# Patient Record
Sex: FEMALE | Race: BLACK OR AFRICAN AMERICAN | NOT HISPANIC OR LATINO | URBAN - METROPOLITAN AREA
[De-identification: names, ages, dates, MRNs, and addresses within clinical notes are randomized per-mention and may not be internally consistent; named-entity substitution may affect disease eponyms.]

---

## 2020-06-05 ENCOUNTER — TELEPHONE ENCOUNTER (OUTPATIENT)
Dept: URBAN - METROPOLITAN AREA CLINIC 23 | Facility: CLINIC | Age: 45
End: 2020-06-05

## 2020-06-08 ENCOUNTER — TELEPHONE ENCOUNTER (OUTPATIENT)
Dept: URBAN - METROPOLITAN AREA CLINIC 92 | Facility: CLINIC | Age: 45
End: 2020-06-08

## 2020-06-08 RX ORDER — ADALIMUMAB 40MG/0.4ML
INJECT 1 PEN KIT SUBCUTANEOUS
Refills: 10
Start: 2020-06-08

## 2020-06-19 ENCOUNTER — TELEPHONE ENCOUNTER (OUTPATIENT)
Dept: URBAN - METROPOLITAN AREA CLINIC 23 | Facility: CLINIC | Age: 45
End: 2020-06-19

## 2020-06-26 ENCOUNTER — TELEPHONE ENCOUNTER (OUTPATIENT)
Dept: URBAN - METROPOLITAN AREA CLINIC 23 | Facility: CLINIC | Age: 45
End: 2020-06-26

## 2020-06-30 ENCOUNTER — TELEPHONE ENCOUNTER (OUTPATIENT)
Dept: URBAN - METROPOLITAN AREA CLINIC 23 | Facility: CLINIC | Age: 45
End: 2020-06-30

## 2020-07-14 ENCOUNTER — OFFICE VISIT (OUTPATIENT)
Dept: URBAN - METROPOLITAN AREA CLINIC 96 | Facility: CLINIC | Age: 45
End: 2020-07-14

## 2020-07-27 ENCOUNTER — OFFICE VISIT (OUTPATIENT)
Dept: URBAN - METROPOLITAN AREA TELEHEALTH 2 | Facility: TELEHEALTH | Age: 45
End: 2020-07-27
Payer: COMMERCIAL

## 2020-07-27 DIAGNOSIS — D50.8 OTHER IRON DEFICIENCY ANEMIAS: ICD-10-CM

## 2020-07-27 DIAGNOSIS — Z79.899 LONG-TERM USE OF IMMUNOSUPPRESSANT MEDICATION: ICD-10-CM

## 2020-07-27 DIAGNOSIS — K50.80 CROHN'S DISEASE OF BOTH SMALL AND LARGE INTESTINE WITHOUT COMPLICATION: ICD-10-CM

## 2020-07-27 DIAGNOSIS — R19.8 BOWEL MOVEMENT SYMPTOM: ICD-10-CM

## 2020-07-27 DIAGNOSIS — Z09 FOLLOW UP: ICD-10-CM

## 2020-07-27 PROCEDURE — G9903 PT SCRN TBCO ID AS NON USER: HCPCS | Performed by: INTERNAL MEDICINE

## 2020-07-27 PROCEDURE — G8417 CALC BMI ABV UP PARAM F/U: HCPCS | Performed by: INTERNAL MEDICINE

## 2020-07-27 PROCEDURE — G8427 DOCREV CUR MEDS BY ELIG CLIN: HCPCS | Performed by: INTERNAL MEDICINE

## 2020-07-27 PROCEDURE — 1036F TOBACCO NON-USER: CPT | Performed by: INTERNAL MEDICINE

## 2020-07-27 PROCEDURE — G8482 FLU IMMUNIZE ORDER/ADMIN: HCPCS | Performed by: INTERNAL MEDICINE

## 2020-07-27 PROCEDURE — 99214 OFFICE O/P EST MOD 30 MIN: CPT | Performed by: INTERNAL MEDICINE

## 2020-07-27 PROCEDURE — 3017F COLORECTAL CA SCREEN DOC REV: CPT | Performed by: INTERNAL MEDICINE

## 2020-07-27 PROCEDURE — G9622 NO UNHEAL ETOH USER: HCPCS | Performed by: INTERNAL MEDICINE

## 2020-07-27 RX ORDER — ADALIMUMAB 40MG/0.4ML
INJECT 1 PEN KIT SUBCUTANEOUS
Refills: 10 | Status: ACTIVE | COMMUNITY
Start: 2020-06-08

## 2020-07-27 RX ORDER — LOSARTAN POTASSIUM 50 MG/1
TABLET ORAL
Qty: 0 | Refills: 0 | Status: ACTIVE | COMMUNITY
Start: 1900-01-01 | End: 1900-01-01

## 2020-07-27 RX ORDER — ADALIMUMAB 40MG/0.8ML
INJECT 0.8 MILLILITER (40 MG) BY SUBCUTANEOUS ROUTE ONCE WEEKLY IN THE ABDOMEN OR THIGH (ROTATE SITES) KIT SUBCUTANEOUS
Qty: 2 | Refills: 6 | Status: ACTIVE | COMMUNITY
Start: 2019-11-04 | End: 1900-01-01

## 2020-07-27 RX ORDER — PREDNISONE 5 MG/1
TAKE 1.5 PILLS DAILY TABLET ORAL
Qty: 60 | Refills: 1 | Status: ACTIVE | COMMUNITY
Start: 2020-03-26 | End: 1900-01-01

## 2020-07-27 RX ORDER — ADALIMUMAB 40MG/0.4ML
0.4 ML KIT SUBCUTANEOUS
Qty: 1 | Refills: 11 | OUTPATIENT
Start: 2020-07-27 | End: 2021-06-28

## 2020-07-27 NOTE — HPI-TODAY'S VISIT:
Julian Shi is a 43 y/o indiv with ileal and colonic CD, currently on Humira (every 2 weeks), here for eval of refractory sxs. . Pt referred by Dr. Servin. . Pt was diagnosed with CD around 2014 (not on any meds), though had sxs prior to this.  She start on Humira in 2016 due to HS, by her dermatologist.  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 She was then re-referred to GI and at the time, her CD was also getting worse.  She was hospitalized 4 times in 2019 due to obstruction sxs.  The dose of Humira was then increase to weekly by Dr Servin due to progression of sxs.  In May 2020 she had partial resection due to progression of her sxs of obstruction (Dr. Jones). . Today on 7/27/2020, pt reports that she is having no abdominal pain while on a GI Soft diet.  She is not having any abdominal pain.  She is having regular BM.  About 24 hours after her Humira injection, she has a large BM, but otherwise, normal. Small amount of blood if she strains at time of defecation.  Her HS is stable on Humira, but not as inflammed as prior.  Pyoderma is on her leg, but is stable.  . Labs 4/2020: Hgb 13, wbc 6.8, Iron st 13, Humira 5.6 (AB neg), quant-gold neg, HepB neg, Vit D 24.1, ferritin 38, CRP 19 . OSH Path: 4/2014: cecum showing active and chronic colitis.  Duodenum showing marked focally active inflammation with ulcer . 5/2019: - The digital rectal exam was normal. - The retroflexed view of the distal rectum and anal verge was normal and showed no anal or rectal abnormalities. - An area of mildly congested, erythematous and eroded mucosa was found in the distal rectum.  Biopsies were taken with a cold forceps for histology. - A benign-appearing, intrinsic moderate stenosis measuring 4 mm (inner diameter) was found at the ileocecal valve and was non-traversed. - A 12 mm polypoid lesion was found at the ileocecal valve.  The lesion was sessile.  Also a smaller 9 mm polypoid lesion. No bleeding was present.  Biopsies were taken with a cold forceps for histology. - The exam was otherwise normal throughout the examined colon. . FinalPathologicDiagnosis A.ILEOCECALVALVE,BIOPSY: -ENTERICMUCOSAHASMILDCHRONICACTIVEINFLAMMATIONWITH FOCALEROSIVECHANGE. -NOGRANULOMASAREIDENTIFIED.     B.RECTUM,BIOPSY: -CHRONICACTIVECOLITIS. -NOGRANULOMASAREIDENTIFIED.     C.DUODENUM,BIOPSY: -ACTIVEDUODENITISWITHREGENERATIVECHANGE?EROSION. -NOGRANULOMASAREIDENTIFIED.     COMMENT:Thoughthehistologicfindingsarenotspecific,theyarecompatiblewiththeclinical/endoscopic considerationofinflammatoryboweldisease.Nodysplasiaisidentified.

## 2020-10-13 ENCOUNTER — TELEPHONE ENCOUNTER (OUTPATIENT)
Dept: URBAN - METROPOLITAN AREA CLINIC 92 | Facility: CLINIC | Age: 45
End: 2020-10-13

## 2021-04-20 ENCOUNTER — WEB ENCOUNTER (OUTPATIENT)
Dept: URBAN - METROPOLITAN AREA CLINIC 96 | Facility: CLINIC | Age: 46
End: 2021-04-20

## 2021-04-20 ENCOUNTER — OFFICE VISIT (OUTPATIENT)
Dept: URBAN - METROPOLITAN AREA CLINIC 96 | Facility: CLINIC | Age: 46
End: 2021-04-20
Payer: COMMERCIAL

## 2021-04-20 ENCOUNTER — LAB OUTSIDE AN ENCOUNTER (OUTPATIENT)
Dept: URBAN - METROPOLITAN AREA CLINIC 96 | Facility: CLINIC | Age: 46
End: 2021-04-20

## 2021-04-20 DIAGNOSIS — Z09 FOLLOW UP: ICD-10-CM

## 2021-04-20 DIAGNOSIS — Z79.899 LONG-TERM USE OF IMMUNOSUPPRESSANT MEDICATION: ICD-10-CM

## 2021-04-20 DIAGNOSIS — K50.80 CROHN'S DISEASE OF BOTH SMALL AND LARGE INTESTINE WITHOUT COMPLICATION: ICD-10-CM

## 2021-04-20 DIAGNOSIS — Z71.89 VACCINE COUNSELING: ICD-10-CM

## 2021-04-20 DIAGNOSIS — D64.9 ANEMIA: ICD-10-CM

## 2021-04-20 DIAGNOSIS — R19.8 BOWEL MOVEMENT SYMPTOM: ICD-10-CM

## 2021-04-20 DIAGNOSIS — Z91.89 COLON CANCER HIGH RISK: ICD-10-CM

## 2021-04-20 PROCEDURE — 99214 OFFICE O/P EST MOD 30 MIN: CPT | Performed by: INTERNAL MEDICINE

## 2021-04-20 RX ORDER — ADALIMUMAB 40MG/0.8ML
INJECT 0.8 MILLILITER (40 MG) BY SUBCUTANEOUS ROUTE ONCE WEEKLY IN THE ABDOMEN OR THIGH (ROTATE SITES) KIT SUBCUTANEOUS
Qty: 2 | Refills: 6 | Status: ACTIVE | COMMUNITY
Start: 2019-11-04

## 2021-04-20 RX ORDER — LOSARTAN POTASSIUM 50 MG/1
TABLET ORAL
Qty: 0 | Refills: 0 | Status: ACTIVE | COMMUNITY
Start: 1900-01-01

## 2021-04-20 RX ORDER — PREDNISONE 5 MG/1
TAKE 1.5 PILLS DAILY TABLET ORAL
Qty: 60 | Refills: 1 | Status: ACTIVE | COMMUNITY
Start: 2020-03-26

## 2021-04-20 RX ORDER — ADALIMUMAB 40MG/0.4ML
0.4 ML KIT SUBCUTANEOUS
Qty: 1 | Refills: 11 | Status: ACTIVE | COMMUNITY
Start: 2020-07-27 | End: 2021-06-28

## 2021-04-20 RX ORDER — SODIUM SULFATE, MAGNESIUM SULFATE, AND POTASSIUM CHLORIDE 17.75; 2.7; 2.25 G/1; G/1; G/1
12 TABLETS TABLET ORAL
Qty: 24 TABLET | Refills: 0 | OUTPATIENT
Start: 2021-04-20 | End: 2021-04-21

## 2021-04-20 RX ORDER — ADALIMUMAB 40MG/0.4ML
0.4 ML KIT SUBCUTANEOUS
Qty: 1 | Refills: 11 | OUTPATIENT

## 2021-04-20 RX ORDER — ADALIMUMAB 40MG/0.4ML
INJECT 1 PEN KIT SUBCUTANEOUS
Refills: 10 | Status: ACTIVE | COMMUNITY
Start: 2020-06-08

## 2021-04-20 NOTE — HPI-TODAY'S VISIT:
Julian Shi is a 46 y/o indiv with ileal and colonic CD, currently on Humira (every 2 weeks), here for eval of refractory sxs. . Pt referred by Dr. Servin. . Pt was diagnosed with CD around 2014 (not on any meds), though had sxs prior to this.  She start on Humira in 2016 due to HS, by her dermatologist.  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 She was then re-referred to GI and at the time, her CD was also getting worse.  She was hospitalized 4 times in 2019 due to obstruction sxs.  The dose of Humira was then increase to weekly by Dr Servin due to progression of sxs.  In May 2020 she had partial resection due to progression of her sxs of obstruction (Dr. Jones). . Previously on 7/27/2020, pt reports that she is having no abdominal pain while on a GI Soft diet.  She is not having any abdominal pain.  She is having regular BM.  About 24 hours after her Humira injection, she has a large BM, but otherwise, normal. Small amount of blood if she strains at time of defecation.  Her HS is stable on Humira, but not as inflammed as prior.  Pyoderma is on her leg, but is stable.  . Today on 4/20/2021, pt reports that her GI is doing well, no diarrhea, no abdominal pain, no nausea.  HS is doing well.  . Labs 4/2020: Hgb 13, wbc 6.8, Iron st 13, Humira 5.6 (AB neg), quant-gold neg, HepB neg, Vit D 24.1, ferritin 38, CRP 19 . OSH Path: 4/2014: cecum showing active and chronic colitis.  Duodenum showing marked focally active inflammation with ulcer . 5/2019: - The digital rectal exam was normal. - The retroflexed view of the distal rectum and anal verge was normal and showed no anal or rectal abnormalities. - An area of mildly congested, erythematous and eroded mucosa was found in the distal rectum.  Biopsies were taken with a cold forceps for histology. - A benign-appearing, intrinsic moderate stenosis measuring 4 mm (inner diameter) was found at the ileocecal valve and was non-traversed. - A 12 mm polypoid lesion was found at the ileocecal valve.  The lesion was sessile.  Also a smaller 9 mm polypoid lesion. No bleeding was present.  Biopsies were taken with a cold forceps for histology. - The exam was otherwise normal throughout the examined colon. . FinalPathologicDiagnosis A.ILEOCECALVALVE,BIOPSY: -ENTERICMUCOSAHASMILDCHRONICACTIVEINFLAMMATIONWITH FOCALEROSIVECHANGE. -NOGRANULOMASAREIDENTIFIED.     B.RECTUM,BIOPSY: -CHRONICACTIVECOLITIS. -NOGRANULOMASAREIDENTIFIED.     C.DUODENUM,BIOPSY: -ACTIVEDUODENITISWITHREGENERATIVECHANGE?EROSION. -NOGRANULOMASAREIDENTIFIED.     COMMENT:Thoughthehistologicfindingsarenotspecific,theyarecompatiblewiththeclinical/endoscopic considerationofinflammatoryboweldisease.Nodysplasiaisidentified.

## 2021-05-08 LAB
A/G RATIO: 1.1
ADALIMUMAB DRUG LEVEL: 1.6
ALBUMIN: 4
ALKALINE PHOSPHATASE: 112
ALT (SGPT): 14
ANTI-ADALIMUMAB ANTIBODY: <25
AST (SGOT): 23
BASO (ABSOLUTE): 0
BASOS: 1
BILIRUBIN, TOTAL: 0.6
BUN/CREATININE RATIO: 9
BUN: 7
CALCIUM: 9.2
CARBON DIOXIDE, TOTAL: 24
CHLORIDE: 101
CREATININE: 0.78
EGFR IF AFRICN AM: 105
EGFR IF NONAFRICN AM: 91
EOS (ABSOLUTE): 0.1
EOS: 2
GLOBULIN, TOTAL: 3.5
GLUCOSE: 112
HEMATOCRIT: 38.8
HEMATOLOGY COMMENTS:: (no result)
HEMOGLOBIN: 12.4
IMMATURE CELLS: (no result)
IMMATURE GRANS (ABS): 0
IMMATURE GRANULOCYTES: 0
LYMPHS (ABSOLUTE): 2
LYMPHS: 36
MCH: 26.1
MCHC: 32
MCV: 82
MONOCYTES(ABSOLUTE): 0.4
MONOCYTES: 7
NEUTROPHILS (ABSOLUTE): 3.1
NEUTROPHILS: 54
NRBC: (no result)
PLATELETS: 355
POTASSIUM: 4.2
PROTEIN, TOTAL: 7.5
QUANTIFERON CRITERIA: (no result)
QUANTIFERON INCUBATION: (no result)
QUANTIFERON MITOGEN VALUE: >10
QUANTIFERON NIL VALUE: 0.15
QUANTIFERON TB1 AG VALUE: 0.17
QUANTIFERON TB2 AG VALUE: 0.16
QUANTIFERON-TB GOLD PLUS: NEGATIVE
RBC: 4.75
RDW: 16.1
SODIUM: 137
VITAMIN B12: 1014
VITAMIN D, 25-HYDROXY: 23.8
WBC: 5.6

## 2021-07-08 PROBLEM — 71833008 CROHN'S DISEASE OF SMALL AND LARGE INTESTINES: Status: ACTIVE | Noted: 2021-07-08

## 2021-08-06 ENCOUNTER — OFFICE VISIT (OUTPATIENT)
Dept: URBAN - METROPOLITAN AREA MEDICAL CENTER 28 | Facility: MEDICAL CENTER | Age: 46
End: 2021-08-06
Payer: COMMERCIAL

## 2021-08-06 DIAGNOSIS — Z98.0 H/O BILLROTH II OPERATION: ICD-10-CM

## 2021-08-06 DIAGNOSIS — K50.80 CROHN'S COLITIS: ICD-10-CM

## 2021-08-06 PROCEDURE — 45380 COLONOSCOPY AND BIOPSY: CPT | Performed by: INTERNAL MEDICINE

## 2021-08-06 RX ORDER — ADALIMUMAB 40MG/0.4ML
INJECT 1 PEN KIT SUBCUTANEOUS
Refills: 10 | Status: ACTIVE | COMMUNITY
Start: 2020-06-08

## 2021-08-06 RX ORDER — LOSARTAN POTASSIUM 50 MG/1
TABLET ORAL
Qty: 0 | Refills: 0 | Status: ACTIVE | COMMUNITY
Start: 1900-01-01

## 2021-08-06 RX ORDER — ADALIMUMAB 40MG/0.8ML
INJECT 0.8 MILLILITER (40 MG) BY SUBCUTANEOUS ROUTE ONCE WEEKLY IN THE ABDOMEN OR THIGH (ROTATE SITES) KIT SUBCUTANEOUS
Qty: 2 | Refills: 6 | Status: ACTIVE | COMMUNITY
Start: 2019-11-04

## 2021-08-06 RX ORDER — PREDNISONE 5 MG/1
TAKE 1.5 PILLS DAILY TABLET ORAL
Qty: 60 | Refills: 1 | Status: ACTIVE | COMMUNITY
Start: 2020-03-26

## 2021-08-06 RX ORDER — ADALIMUMAB 40MG/0.4ML
0.4 ML KIT SUBCUTANEOUS
Qty: 1 | Refills: 11 | Status: ACTIVE | COMMUNITY

## 2021-09-13 ENCOUNTER — OFFICE VISIT (OUTPATIENT)
Dept: URBAN - METROPOLITAN AREA TELEHEALTH 2 | Facility: TELEHEALTH | Age: 46
End: 2021-09-13
Payer: COMMERCIAL

## 2021-09-13 ENCOUNTER — TELEPHONE ENCOUNTER (OUTPATIENT)
Dept: URBAN - METROPOLITAN AREA CLINIC 92 | Facility: CLINIC | Age: 46
End: 2021-09-13

## 2021-09-13 DIAGNOSIS — K76.0 FATTY (CHANGE OF) LIVER: ICD-10-CM

## 2021-09-13 DIAGNOSIS — K50.80 CROHN'S DISEASE OF BOTH SMALL AND LARGE INTESTINE WITHOUT COMPLICATION: ICD-10-CM

## 2021-09-13 DIAGNOSIS — Z79.899 LONG-TERM USE OF IMMUNOSUPPRESSANT MEDICATION: ICD-10-CM

## 2021-09-13 DIAGNOSIS — D64.89 ANEMIA DUE TO OTHER CAUSE: ICD-10-CM

## 2021-09-13 PROBLEM — 71833008 CROHN'S DISEASE OF SMALL AND LARGE INTESTINES: Status: ACTIVE | Noted: 2021-04-20

## 2021-09-13 PROCEDURE — 99215 OFFICE O/P EST HI 40 MIN: CPT | Performed by: INTERNAL MEDICINE

## 2021-09-13 RX ORDER — METHOTREXATE SODIUM 2.5 MG/1
5 TABLET ORAL WEEKLY
Qty: 60 | Refills: 1 | OUTPATIENT
Start: 2021-09-13

## 2021-09-13 RX ORDER — FOLIC ACID 1 MG/1
1 TABLET TABLET ORAL ONCE A DAY
Qty: 30 | Refills: 11 | OUTPATIENT
Start: 2021-09-13 | End: 2022-09-08

## 2021-09-13 RX ORDER — ADALIMUMAB 40MG/0.4ML
INJECT 1 PEN KIT SUBCUTANEOUS
Refills: 10 | Status: ACTIVE | COMMUNITY
Start: 2020-06-08

## 2021-09-13 RX ORDER — SULFASALAZINE 500 MG/1
4 TABLET TABLET ORAL ONCE A DAY
Qty: 120 TABLET | Refills: 4 | OUTPATIENT
Start: 2021-09-13 | End: 2022-02-09

## 2021-09-13 RX ORDER — ADALIMUMAB 40MG/0.4ML
0.4 ML KIT SUBCUTANEOUS
Qty: 1 | Refills: 11 | Status: ACTIVE | COMMUNITY

## 2021-09-13 RX ORDER — ADALIMUMAB 40MG/0.4ML
0.4 ML KIT SUBCUTANEOUS
Qty: 1 | Refills: 11 | OUTPATIENT

## 2021-09-13 RX ORDER — LOSARTAN POTASSIUM 50 MG/1
TABLET ORAL
Qty: 0 | Refills: 0 | Status: ACTIVE | COMMUNITY
Start: 1900-01-01

## 2021-09-13 RX ORDER — PREDNISONE 5 MG/1
TAKE 1.5 PILLS DAILY TABLET ORAL
Qty: 60 | Refills: 1 | Status: ACTIVE | COMMUNITY
Start: 2020-03-26

## 2021-09-13 RX ORDER — ADALIMUMAB 40MG/0.8ML
INJECT 0.8 MILLILITER (40 MG) BY SUBCUTANEOUS ROUTE ONCE WEEKLY IN THE ABDOMEN OR THIGH (ROTATE SITES) KIT SUBCUTANEOUS
Qty: 2 | Refills: 6 | Status: ACTIVE | COMMUNITY
Start: 2019-11-04

## 2021-09-13 NOTE — HPI-TODAY'S VISIT:
Julian Shi is a 46 y/o indiv with ileal and colonic CD, currently on Humira (every 2 weeks), here for eval of refractory sxs. . Pt referred by Dr. Servin. . Pt was diagnosed with CD around 2014 (not on any meds), though had sxs prior to this.  She start on Humira in 2016 due to HS, by her dermatologist.  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 She was then re-referred to GI and at the time, her CD was also getting worse.  She was hospitalized 4 times in 2019 due to obstruction sxs.  The dose of Humira was then increase to weekly by Dr Servin due to progression of sxs.  In May 2020 she had partial resection due to progression of her sxs of obstruction (Dr. Jones). . Previously on 7/27/2020, pt reports that she is having no abdominal pain while on a GI Soft diet.  She is not having any abdominal pain.  She is having regular BM.  About 24 hours after her Humira injection, she has a large BM, but otherwise, normal. Small amount of blood if she strains at time of defecation.  Her HS is stable on Humira, but not as inflammed as prior.  Pyoderma is on her leg, but is stable.  . Previously on 4/20/2021, pt reports that her GI is doing well, no diarrhea, no abdominal pain, no nausea.  HS is doing well.  . Today on 9/13/2021, pt reports that she is otherwise doing fine. . Labs 4/2020: Hgb 13, wbc 6.8, Iron st 13, Humira 5.6 (AB neg), quant-gold neg, HepB neg, Vit D 24.1, ferritin 38, CRP 19 . 8/2021: some ulcerations seen at the anastomosis. Biopsies showed mild-architecteral changes but mild.  Rectum shows active colitis present. . OSH Path: 4/2014: cecum showing active and chronic colitis.  Duodenum showing marked focally active inflammation with ulcer . 5/2019: - The digital rectal exam was normal. - The retroflexed view of the distal rectum and anal verge was normal and showed no anal or rectal abnormalities. - An area of mildly congested, erythematous and eroded mucosa was found in the distal rectum.  Biopsies were taken with a cold forceps for histology. - A benign-appearing, intrinsic moderate stenosis measuring 4 mm (inner diameter) was found at the ileocecal valve and was non-traversed. - A 12 mm polypoid lesion was found at the ileocecal valve.  The lesion was sessile.  Also a smaller 9 mm polypoid lesion. No bleeding was present.  Biopsies were taken with a cold forceps for histology. - The exam was otherwise normal throughout the examined colon. . FinalPathologicDiagnosis A.ILEOCECALVALVE,BIOPSY: -ENTERICMUCOSAHASMILDCHRONICACTIVEINFLAMMATIONWITH FOCALEROSIVECHANGE. -NOGRANULOMASAREIDENTIFIED.     B.RECTUM,BIOPSY: -CHRONICACTIVECOLITIS. -NOGRANULOMASAREIDENTIFIED.     C.DUODENUM,BIOPSY: -ACTIVEDUODENITISWITHREGENERATIVECHANGE?EROSION. -NOGRANULOMASAREIDENTIFIED.     COMMENT:Thoughthehistologicfindingsarenotspecific,theyarecompatiblewiththeclinical/endoscopic considerationofinflammatoryboweldisease.Nodysplasiaisidentified.

## 2021-10-08 LAB
ALPHA 2-MACROGLOBULINS, QN: 238
ALT (SGPT) P5P: 13
APOLIPOPROTEIN A-1: 144
AST (SGOT) P5P: 25
BILIRUBIN, TOTAL: 0.7
CHOLESTEROL, TOTAL: 196
COMMENT:: (no result)
FIBROSIS SCORE: 0.32
FIBROSIS SCORING:: (no result)
FIBROSIS STAGE: (no result)
GGT: 72
GLUCOSE, SERUM: 86
HAPTOGLOBIN: 153
HEIGHT:: 65
INTERPRETATIONS:: (no result)
LIMITATIONS:: (no result)
NASH GRADE: (no result)
NASH SCORE: 0.5
NASH SCORING: (no result)
STEATOSIS GRADE: (no result)
STEATOSIS GRADING: (no result)
STEATOSIS SCORE: 0.53
TRIGLYCERIDES: 121
WEIGHT:: 321

## 2021-10-18 ENCOUNTER — TELEPHONE ENCOUNTER (OUTPATIENT)
Dept: URBAN - METROPOLITAN AREA CLINIC 98 | Facility: CLINIC | Age: 46
End: 2021-10-18

## 2021-10-18 RX ORDER — FOLIC ACID 1 MG/1
1 TABLET TABLET ORAL ONCE A DAY
Qty: 90 TABLET | Refills: 4
Start: 2021-09-13 | End: 2023-01-10

## 2022-03-30 ENCOUNTER — P2P PATIENT RECORD (OUTPATIENT)
Age: 47
End: 2022-03-30

## 2022-05-05 ENCOUNTER — TELEPHONE ENCOUNTER (OUTPATIENT)
Dept: URBAN - METROPOLITAN AREA CLINIC 92 | Facility: CLINIC | Age: 47
End: 2022-05-05

## 2022-05-05 RX ORDER — ADALIMUMAB 40MG/0.4ML
0.4 ML KIT SUBCUTANEOUS
Qty: 1 | Refills: 11
End: 2023-04-07

## 2023-04-12 ENCOUNTER — TELEPHONE ENCOUNTER (OUTPATIENT)
Dept: URBAN - METROPOLITAN AREA CLINIC 96 | Facility: CLINIC | Age: 48
End: 2023-04-12

## 2023-04-12 RX ORDER — ADALIMUMAB 40MG/0.4ML
0.4 ML KIT SUBCUTANEOUS
Qty: 4 | Refills: 11 | OUTPATIENT
Start: 2023-04-12 | End: 2024-03-13

## 2023-04-13 ENCOUNTER — TELEPHONE ENCOUNTER (OUTPATIENT)
Dept: URBAN - METROPOLITAN AREA CLINIC 96 | Facility: CLINIC | Age: 48
End: 2023-04-13

## 2023-04-13 RX ORDER — ADALIMUMAB 40MG/0.4ML
0.4 ML KIT SUBCUTANEOUS
Qty: 1 | Refills: 11
Start: 2023-04-12 | End: 2024-03-18

## 2023-04-26 LAB
A/G RATIO: 1.1
ABSOLUTE BASOPHILS: 49
ABSOLUTE EOSINOPHILS: 63
ABSOLUTE LYMPHOCYTES: 2436
ABSOLUTE MONOCYTES: 602
ABSOLUTE NEUTROPHILS: 3850
ALBUMIN: 3.8
ALKALINE PHOSPHATASE: 84
ALT (SGPT): 15
AST (SGOT): 17
BASOPHILS: 0.7
BILIRUBIN, TOTAL: 0.6
BUN/CREATININE RATIO: (no result)
BUN: 10
CALCIUM: 9.4
CARBON DIOXIDE, TOTAL: 28
CHLORIDE: 102
CREATININE: 0.7
EGFR: 107
EOSINOPHILS: 0.9
GLOBULIN, TOTAL: 3.6
GLUCOSE: 98
HEMATOCRIT: 33
HEMOGLOBIN: 10.6
LYMPHOCYTES: 34.8
MCH: 23.8
MCHC: 32.1
MCV: 74.2
MITOGEN-NIL: >10
MONOCYTES: 8.6
MPV: 10.4
NEUTROPHILS: 55
PLATELET COUNT: 364
POTASSIUM: 4.1
PROTEIN, TOTAL: 7.4
QUANTIFERON NIL VALUE: 0.02
QUANTIFERON TB1 AG VALUE: 0.01
QUANTIFERON TB2 AG VALUE: 0
QUANTIFERON-TB GOLD PLUS: NEGATIVE
RDW: 16.5
RED BLOOD CELL COUNT: 4.45
SODIUM: 137
WHITE BLOOD CELL COUNT: 7

## 2023-06-14 ENCOUNTER — LAB OUTSIDE AN ENCOUNTER (OUTPATIENT)
Dept: URBAN - METROPOLITAN AREA CLINIC 96 | Facility: CLINIC | Age: 48
End: 2023-06-14

## 2023-06-14 ENCOUNTER — OFFICE VISIT (OUTPATIENT)
Dept: URBAN - METROPOLITAN AREA CLINIC 96 | Facility: CLINIC | Age: 48
End: 2023-06-14
Payer: COMMERCIAL

## 2023-06-14 VITALS
TEMPERATURE: 97.7 F | HEIGHT: 65 IN | BODY MASS INDEX: 48.82 KG/M2 | DIASTOLIC BLOOD PRESSURE: 85 MMHG | WEIGHT: 293 LBS | SYSTOLIC BLOOD PRESSURE: 160 MMHG

## 2023-06-14 DIAGNOSIS — R19.8 BOWEL MOVEMENT SYMPTOM: ICD-10-CM

## 2023-06-14 DIAGNOSIS — K76.0 FATTY (CHANGE OF) LIVER: ICD-10-CM

## 2023-06-14 DIAGNOSIS — K50.80 CROHN'S DISEASE OF BOTH SMALL AND LARGE INTESTINE WITHOUT COMPLICATION: ICD-10-CM

## 2023-06-14 DIAGNOSIS — D50.8 ACQUIRED IRON DEFICIENCY ANEMIA DUE TO DECREASED ABSORPTION: ICD-10-CM

## 2023-06-14 PROCEDURE — 99215 OFFICE O/P EST HI 40 MIN: CPT | Performed by: INTERNAL MEDICINE

## 2023-06-14 RX ORDER — LOSARTAN POTASSIUM 50 MG/1
TABLET ORAL
Qty: 0 | Refills: 0 | Status: ACTIVE | COMMUNITY
Start: 1900-01-01

## 2023-06-14 RX ORDER — ADALIMUMAB 40MG/0.4ML
0.4 ML KIT SUBCUTANEOUS
Qty: 1 | Refills: 11 | Status: ACTIVE | COMMUNITY
Start: 2023-04-12 | End: 2024-03-18

## 2023-06-14 RX ORDER — ADALIMUMAB 40MG/0.4ML
0.4 ML KIT SUBCUTANEOUS
Qty: 1 | Refills: 11 | OUTPATIENT

## 2023-06-14 NOTE — HPI-TODAY'S VISIT:
Pt Jose Guadalupe is a 48 y/o indiv with ileal and colonic CD, currently on Humira (every 2 weeks), here for eval of refractory sxs. . Pt referred by Dr. Servin. . Pt was diagnosed with CD around 2014 (not on any meds), though had sxs prior to this.  She start on Humira in 2016 due to HS, by her dermatologist.  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 She was then re-referred to GI and at the time, her CD was also getting worse.  She was hospitalized 4 times in 2019 due to obstruction sxs.  The dose of Humira was then increase to weekly by Dr Servin due to progression of sxs.  In May 2020 she had partial resection due to progression of her sxs of obstruction (Dr. Jones).  Was not able to tolerate sulfasalazine due to size. . Previously on 7/27/2020, pt reports that she is having no abdominal pain while on a GI Soft diet.  She is not having any abdominal pain.  She is having regular BM.  About 24 hours after her Humira injection, she has a large BM, but otherwise, normal. Small amount of blood if she strains at time of defecation.  Her HS is stable on Humira, but not as inflammed as prior.  Pyoderma is on her leg, but is stable.  . Previously on 4/20/2021, pt reports that her GI is doing well, no diarrhea, no abdominal pain, no nausea.  HS is doing well.  . Previously on 9/13/2021, pt reports that she is otherwise doing fine. . Today on 6/14/2023, pt reports she had retinal detachment and also thyroid resection.  Some recurrent sinus issues ever since getting covid booster.  Has sleep apnea.  Weight has been going upwards.  GI is otherwise doing well.  Having tonsils removed for isssues for URI and sleep apnea.   . Labs 4/2020: Hgb 13, wbc 6.8, Iron st 13, Humira 5.6 (AB neg), quant-gold neg, HepB neg, Vit D 24.1, ferritin 38, CRP 19 . 8/2021: some ulcerations seen at the anastomosis. Biopsies showed mild-architecteral changes but mild.  Rectum shows active colitis present. . OSH Path: 4/2014: cecum showing active and chronic colitis.  Duodenum showing marked focally active inflammation with ulcer . 5/2019: - The digital rectal exam was normal. - The retroflexed view of the distal rectum and anal verge was normal and showed no anal or rectal abnormalities. - An area of mildly congested, erythematous and eroded mucosa was found in the distal rectum.  Biopsies were taken with a cold forceps for histology. - A benign-appearing, intrinsic moderate stenosis measuring 4 mm (inner diameter) was found at the ileocecal valve and was non-traversed. - A 12 mm polypoid lesion was found at the ileocecal valve.  The lesion was sessile.  Also a smaller 9 mm polypoid lesion. No bleeding was present.  Biopsies were taken with a cold forceps for histology. - The exam was otherwise normal throughout the examined colon. . FinalPathologicDiagnosis A.ILEOCECALVALVE,BIOPSY: -ENTERICMUCOSAHASMILDCHRONICACTIVEINFLAMMATIONWITH FOCALEROSIVECHANGE. -NOGRANULOMASAREIDENTIFIED.     B.RECTUM,BIOPSY: -CHRONICACTIVECOLITIS. -NOGRANULOMASAREIDENTIFIED.     C.DUODENUM,BIOPSY: -ACTIVEDUODENITISWITHREGENERATIVECHANGE?EROSION. -NOGRANULOMASAREIDENTIFIED.     COMMENT:Thoughthehistologicfindingsarenotspecific,theyarecompatiblewiththeclinical/endoscopic considerationofinflammatoryboweldisease.Nodysplasiaisidentified.

## 2023-06-26 LAB
A/G RATIO: 1.1
ABSOLUTE BASOPHILS: 43
ABSOLUTE EOSINOPHILS: 79
ABSOLUTE LYMPHOCYTES: 2174
ABSOLUTE MONOCYTES: 540
ABSOLUTE NEUTROPHILS: 4363
ADALIMUMAB AB, IBD: <10
ADALIMUMAB AB, IBD: <10
ADALIMUMAB LEVEL, IBD: 3.8
ALBUMIN: 4.2
ALKALINE PHOSPHATASE: 81
ALT (SGPT): 15
AST (SGOT): 20
BASOPHILS: 0.6
BILIRUBIN, TOTAL: 0.8
BUN/CREATININE RATIO: (no result)
BUN: 9
CALCIUM: 9.8
CARBON DIOXIDE, TOTAL: 26
CHLORIDE: 100
CHOL/HDLC RATIO: 3.6
CHOLESTEROL, TOTAL: 198
COMMENT: (no result)
CREATININE: 0.66
EGFR: 108
EOSINOPHILS: 1.1
FERRITIN, SERUM: 7
GLOBULIN, TOTAL: 3.8
GLUCOSE: 93
HDL CHOLESTEROL: 55
HEMATOCRIT: 34.8
HEMOGLOBIN: 11.3
INTERPRETATION: (no result)
IRON BIND.CAP.(TIBC): 484
IRON SATURATION: 7
IRON: 35
LDL CHOLESTEROL CALC: 118
LYMPHOCYTES: 30.2
MAGNESIUM: 1.9
MCH: 24.2
MCHC: 32.5
MCV: 74.7
MONOCYTES: 7.5
MPV: 9.8
NEUTROPHILS: 60.6
NON HDL CHOLESTEROL: 143
PLATELET COUNT: 481
POTASSIUM: 4.2
PROTEIN, TOTAL: 8
RDW: 17.9
RED BLOOD CELL COUNT: 4.66
SODIUM: 138
TRIGLYCERIDES: 140
VITAMIN B12: 617
VITAMIN D,25-OH,TOTAL,IA: 50
WHITE BLOOD CELL COUNT: 7.2
ZINC, PLASMA OR SERUM: 54

## 2023-11-27 ENCOUNTER — TELEPHONE ENCOUNTER (OUTPATIENT)
Dept: URBAN - METROPOLITAN AREA CLINIC 96 | Facility: CLINIC | Age: 48
End: 2023-11-27

## 2023-11-27 RX ORDER — SODIUM PICOSULFATE, MAGNESIUM OXIDE, AND ANHYDROUS CITRIC ACID 10; 3.5; 12 MG/160ML; G/160ML; G/160ML
320 ML LIQUID ORAL ONCE
Qty: 1 KIT | Refills: 1 | OUTPATIENT
Start: 2023-11-27 | End: 2023-11-29

## 2023-11-27 RX ORDER — ONDANSETRON HYDROCHLORIDE 4 MG/1
1 TABLET TABLET, FILM COATED ORAL EVERY 4 HOURS PRN
Qty: 3 | Refills: 0 | OUTPATIENT
Start: 2023-11-27

## 2023-12-01 ENCOUNTER — OFFICE VISIT (OUTPATIENT)
Dept: URBAN - METROPOLITAN AREA MEDICAL CENTER 28 | Facility: MEDICAL CENTER | Age: 48
End: 2023-12-01
Payer: COMMERCIAL

## 2023-12-01 DIAGNOSIS — K50.80 CROHN'S COLITIS: ICD-10-CM

## 2023-12-01 DIAGNOSIS — K62.89 ACUTE PROCTITIS: ICD-10-CM

## 2023-12-01 DIAGNOSIS — K29.50 ANTRAL GASTRITIS: ICD-10-CM

## 2023-12-01 DIAGNOSIS — K63.5 BENIGN COLON POLYP: ICD-10-CM

## 2023-12-01 DIAGNOSIS — K63.89 APPENDICITIS EPIPLOICA: ICD-10-CM

## 2023-12-01 DIAGNOSIS — K30 ACID INDIGESTION: ICD-10-CM

## 2023-12-01 DIAGNOSIS — K29.80 ACUTE DUODENITIS: ICD-10-CM

## 2023-12-01 PROCEDURE — 45380 COLONOSCOPY AND BIOPSY: CPT | Performed by: INTERNAL MEDICINE

## 2023-12-01 PROCEDURE — 45385 COLONOSCOPY W/LESION REMOVAL: CPT | Performed by: INTERNAL MEDICINE

## 2023-12-01 PROCEDURE — 43239 EGD BIOPSY SINGLE/MULTIPLE: CPT | Performed by: INTERNAL MEDICINE

## 2023-12-01 RX ORDER — ONDANSETRON HYDROCHLORIDE 4 MG/1
1 TABLET TABLET, FILM COATED ORAL EVERY 4 HOURS PRN
Qty: 3 | Refills: 0 | Status: ACTIVE | COMMUNITY
Start: 2023-11-27

## 2023-12-01 RX ORDER — ADALIMUMAB 40MG/0.4ML
0.4 ML KIT SUBCUTANEOUS
Qty: 1 | Refills: 11 | Status: ACTIVE | COMMUNITY

## 2023-12-01 RX ORDER — LOSARTAN POTASSIUM 50 MG/1
TABLET ORAL
Qty: 0 | Refills: 0 | Status: ACTIVE | COMMUNITY
Start: 1900-01-01

## 2023-12-01 RX ORDER — ADALIMUMAB 40MG/0.4ML
0.4 ML KIT SUBCUTANEOUS
Qty: 1 | Refills: 11 | Status: ACTIVE | COMMUNITY
Start: 2023-04-12 | End: 2024-03-18

## 2024-04-30 ENCOUNTER — OV EP (OUTPATIENT)
Dept: URBAN - METROPOLITAN AREA CLINIC 96 | Facility: CLINIC | Age: 49
End: 2024-04-30

## 2024-04-30 VITALS
HEIGHT: 65 IN | BODY MASS INDEX: 48.82 KG/M2 | TEMPERATURE: 97.6 F | DIASTOLIC BLOOD PRESSURE: 99 MMHG | SYSTOLIC BLOOD PRESSURE: 141 MMHG | WEIGHT: 293 LBS | HEART RATE: 88 BPM

## 2024-04-30 RX ORDER — LOSARTAN POTASSIUM 50 MG/1
TABLET ORAL
Qty: 0 | Refills: 0 | Status: ACTIVE | COMMUNITY
Start: 1900-01-01

## 2024-04-30 RX ORDER — ADALIMUMAB 40MG/0.4ML
0.4 ML KIT SUBCUTANEOUS
Qty: 1 | Refills: 11 | OUTPATIENT

## 2024-04-30 RX ORDER — ONDANSETRON HYDROCHLORIDE 4 MG/1
1 TABLET TABLET, FILM COATED ORAL EVERY 4 HOURS PRN
Qty: 3 | Refills: 0 | Status: ACTIVE | COMMUNITY
Start: 2023-11-27

## 2024-04-30 RX ORDER — ADALIMUMAB 40MG/0.4ML
0.4 ML KIT SUBCUTANEOUS
Qty: 1 | Refills: 11 | Status: ACTIVE | COMMUNITY

## 2024-04-30 NOTE — HPI-TODAY'S VISIT:
Julian Shi is a 49 y/o indiv with ileal and colonic CD, currently on Humira (every 2 weeks), here for eval of refractory sxs. . Pt referred by Dr. Servin. . Pt was diagnosed with CD around 2014 (not on any meds), though had sxs prior to this.  She start on Humira in 2016 due to HS, by her dermatologist.  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 She was then re-referred to GI and at the time, her CD was also getting worse.  She was hospitalized 4 times in 2019 due to obstruction sxs.  The dose of Humira was then increase to weekly by Dr Servin due to progression of sxs.  In May 2020 she had partial resection due to progression of her sxs of obstruction (Dr. Jones).  Was not able to tolerate sulfasalazine due to size. . Previously on 7/27/2020, pt reports that she is having no abdominal pain while on a GI Soft diet.  She is not having any abdominal pain.  She is having regular BM.  About 24 hours after her Humira injection, she has a large BM, but otherwise, normal. Small amount of blood if she strains at time of defecation.  Her HS is stable on Humira, but not as inflammed as prior.  Pyoderma is on her leg, but is stable.  . Previously on 4/20/2021, pt reports that her GI is doing well, no diarrhea, no abdominal pain, no nausea.  HS is doing well.  . Previously on 9/13/2021, pt reports that she is otherwise doing fine. . Previously on 6/14/2023, pt reports she had retinal detachment and also thyroid resection.  Some recurrent sinus issues ever since getting covid booster.  Has sleep apnea.  Weight has been going upwards.  GI is otherwise doing well.  Having tonsils removed for isssues for URI and sleep apnea.   . Today on 4/30/2024, pt reports that she has not had her Humira for 2 months due to insurance lacking of script.  Having diarrhea, rectal bleeding and some constipation (relates to flare). .  Labs 4/2020: Hgb 13, wbc 6.8, Iron st 13, Humira 5.6 (AB neg), quant-gold neg, HepB neg, Vit D 24.1, ferritin 38, CRP 19 . 12/2023: inal Pathologic DiagnosisA. DUODENUM, BIOPSY:- DUODENAL TYPE MUCOSA WITH INCREASED INTRAEPITHELIAL LYMPHOCYTES/ CHRONICINFLAMMATION.- SEE MICROSCOPIC DESCRIPTION.B. STOMACH, BIOPSY:- FRAGMENTS OF GASTRIC ANTRAL AND OXYNTIC TYPE MUCOSA WITH MILD CHRONICGASTRITIS.- NEGATIVE FOR H. PYLORI ORGANISMS.- NEGATIVE FOR INTESTINAL METAPLASIA, DYSPLASIA, OR MALIGNANCY.C. ILEUM, BIOPSY:- UNREMARKABLE FRAGMENTS OF SMALL INTESTINAL MUCOSA.D. ANASTOMOSIS, BIOPSY:- COLONIC MUCOSA WITH EXTENSIVE ACTIVE INFLAMMATION AND ASSOCIATEDULCERATION.- NEGATIVE FOR DYSPLASIA OR MALIGNANCY.E. TRANSVERSE COLON, BIOPSY:- UNREMARKABLE FRAGMENTS OF COLONIC MUCOSA NEGATIVE FOR FEATURES OFINFLAMMATORY BOWEL DISEASE.F. DESCENDING COLON POLYP, BIOPSY:- FRAGMENTS OF HYPERPLASTIC POLYP.G. RECTUM, BIOPSY:- FRAGMENTS OF COLONIC MUCOSA WITH INCREASED CHRONIC INFLAMMATION ANDREACTIVE CHANGES; NEGATIVE FOR ACTIVE INFLAMMATION.- NEGATIVE FOR DYSPLASIA OR MALIGNANCY. . 8/2021: some ulcerations seen at the anastomosis. Biopsies showed mild-architecteral changes but mild.  Rectum shows active colitis present. . OSH Path: 4/2014: cecum showing active and chronic colitis.  Duodenum showing marked focally active inflammation with ulcer . 5/2019: - The digital rectal exam was normal. - The retroflexed view of the distal rectum and anal verge was normal and showed no anal or rectal abnormalities. - An area of mildly congested, erythematous and eroded mucosa was found in the distal rectum.  Biopsies were taken with a cold forceps for histology. - A benign-appearing, intrinsic moderate stenosis measuring 4 mm (inner diameter) was found at the ileocecal valve and was non-traversed. - A 12 mm polypoid lesion was found at the ileocecal valve.  The lesion was sessile.  Also a smaller 9 mm polypoid lesion. No bleeding was present.  Biopsies were taken with a cold forceps for histology. - The exam was otherwise normal throughout the examined colon. . FinalPathologicDiagnosis A.ILEOCECALVALVE,BIOPSY: -ENTERICMUCOSAHASMILDCHRONICACTIVEINFLAMMATIONWITH FOCALEROSIVECHANGE. -NOGRANULOMASAREIDENTIFIED.     B.RECTUM,BIOPSY: -CHRONICACTIVECOLITIS. -NOGRANULOMASAREIDENTIFIED.     C.DUODENUM,BIOPSY: -ACTIVEDUODENITISWITHREGENERATIVECHANGE?EROSION. -NOGRANULOMASAREIDENTIFIED.     COMMENT:Thoughthehistologicfindingsarenotspecific,theyarecompatiblewiththeclinical/endoscopic considerationofinflammatoryboweldisease.Nodysplasiaisidentified.

## 2024-05-04 ENCOUNTER — TELEPHONE ENCOUNTER (OUTPATIENT)
Dept: URBAN - METROPOLITAN AREA CLINIC 6 | Facility: CLINIC | Age: 49
End: 2024-05-04

## 2024-05-08 ENCOUNTER — TELEPHONE ENCOUNTER (OUTPATIENT)
Dept: URBAN - METROPOLITAN AREA CLINIC 6 | Facility: CLINIC | Age: 49
End: 2024-05-08

## 2024-05-10 ENCOUNTER — TELEPHONE ENCOUNTER (OUTPATIENT)
Dept: URBAN - METROPOLITAN AREA CLINIC 96 | Facility: CLINIC | Age: 49
End: 2024-05-10

## 2024-07-16 ENCOUNTER — WEB ENCOUNTER (OUTPATIENT)
Dept: URBAN - METROPOLITAN AREA CLINIC 96 | Facility: CLINIC | Age: 49
End: 2024-07-16

## 2024-08-06 ENCOUNTER — DASHBOARD ENCOUNTERS (OUTPATIENT)
Age: 49
End: 2024-08-06

## 2024-08-06 ENCOUNTER — OFFICE VISIT (OUTPATIENT)
Dept: URBAN - METROPOLITAN AREA CLINIC 96 | Facility: CLINIC | Age: 49
End: 2024-08-06
Payer: COMMERCIAL

## 2024-08-06 VITALS
SYSTOLIC BLOOD PRESSURE: 152 MMHG | HEIGHT: 65 IN | WEIGHT: 293 LBS | BODY MASS INDEX: 48.82 KG/M2 | TEMPERATURE: 96.8 F | DIASTOLIC BLOOD PRESSURE: 92 MMHG | HEART RATE: 106 BPM

## 2024-08-06 DIAGNOSIS — R10.13 DYSPEPSIA: ICD-10-CM

## 2024-08-06 DIAGNOSIS — R19.8 BOWEL MOVEMENT SYMPTOM: ICD-10-CM

## 2024-08-06 DIAGNOSIS — Z79.899 LONG-TERM USE OF IMMUNOSUPPRESSANT MEDICATION: ICD-10-CM

## 2024-08-06 DIAGNOSIS — K50.80 CROHN'S DISEASE OF BOTH SMALL AND LARGE INTESTINE WITHOUT COMPLICATION: ICD-10-CM

## 2024-08-06 DIAGNOSIS — K21.9 GERD: ICD-10-CM

## 2024-08-06 DIAGNOSIS — Z09 FOLLOW UP: ICD-10-CM

## 2024-08-06 DIAGNOSIS — Z91.89 COLON CANCER HIGH RISK: ICD-10-CM

## 2024-08-06 PROCEDURE — 99214 OFFICE O/P EST MOD 30 MIN: CPT | Performed by: INTERNAL MEDICINE

## 2024-08-06 RX ORDER — LOSARTAN POTASSIUM 50 MG/1
TABLET ORAL
Qty: 0 | Refills: 0 | Status: ACTIVE | COMMUNITY
Start: 1900-01-01

## 2024-08-06 RX ORDER — ESOMEPRAZOLE MAGNESIUM 20 MG/1
1 CAPSULE CAPSULE, DELAYED RELEASE ORAL ONCE A DAY
Qty: 90 CAPSULE | Refills: 4 | OUTPATIENT
Start: 2024-08-06

## 2024-08-06 RX ORDER — ONDANSETRON HYDROCHLORIDE 4 MG/1
1 TABLET TABLET, FILM COATED ORAL EVERY 4 HOURS PRN
Qty: 3 | Refills: 0 | Status: ACTIVE | COMMUNITY
Start: 2023-11-27

## 2024-08-06 RX ORDER — ADALIMUMAB 40MG/0.4ML
0.4 ML KIT SUBCUTANEOUS
Qty: 1 | Refills: 11 | OUTPATIENT

## 2024-08-06 RX ORDER — ADALIMUMAB-ADAZ 40 MG/.4ML
0.4ML INJECTION, SOLUTION SUBCUTANEOUS EVERY 2 WEEKS
Qty: 2 | Refills: 11 | Status: ACTIVE | COMMUNITY
Start: 2024-04-30 | End: 2024-10-15

## 2024-08-06 RX ORDER — ADALIMUMAB 40MG/0.4ML
0.4 ML KIT SUBCUTANEOUS
Qty: 1 | Refills: 11 | Status: ACTIVE | COMMUNITY

## 2024-08-06 NOTE — HPI-TODAY'S VISIT:
Julian Shi is a 49 y/o indiv with ileal and colonic CD, currently on Humira (every 2 weeks), here for eval of refractory sxs. . Pt referred by Dr. Servin. . Pt was diagnosed with CD around 2014 (not on any meds), though had sxs prior to this.  She start on Humira in 2016 due to HS, by her dermatologist.  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 She was then re-referred to GI and at the time, her CD was also getting worse.  She was hospitalized 4 times in 2019 due to obstruction sxs.  The dose of Humira was then increase to weekly by Dr Servin due to progression of sxs.  In May 2020 she had partial resection due to progression of her sxs of obstruction (Dr. Jones).  Was not able to tolerate sulfasalazine due to size. . Previously on 7/27/2020, pt reports that she is having no abdominal pain while on a GI Soft diet.  She is not having any abdominal pain.  She is having regular BM.  About 24 hours after her Humira injection, she has a large BM, but otherwise, normal. Small amount of blood if she strains at time of defecation.  Her HS is stable on Humira, but not as inflammed as prior.  Pyoderma is on her leg, but is stable.  . Previously on 4/20/2021, pt reports that her GI is doing well, no diarrhea, no abdominal pain, no nausea.  HS is doing well.  . Previously on 9/13/2021, pt reports that she is otherwise doing fine. . Previously on 6/14/2023, pt reports she had retinal detachment and also thyroid resection.  Some recurrent sinus issues ever since getting covid booster.  Has sleep apnea.  Weight has been going upwards.  GI is otherwise doing well.  Having tonsils removed for isssues for URI and sleep apnea.   . Prev on 4/30/2024, pt reports that she has not had her Humira for 2 months due to insurance lacking of script.  Having diarrhea, rectal bleeding and some constipation (relates to flare). . Today on 8/6/2024, pt had side effects of Hyrizmoz (generic humira), severe abd pain/bowel changes.  After getting back on Humira, sxs were back to control and normal BM and no blood. . Labs 4/2020: Hgb 13, wbc 6.8, Iron st 13, Humira 5.6 (AB neg), quant-gold neg, HepB neg, Vit D 24.1, ferritin 38, CRP 19 . 12/2023: inal Pathologic DiagnosisA. DUODENUM, BIOPSY:- DUODENAL TYPE MUCOSA WITH INCREASED INTRAEPITHELIAL LYMPHOCYTES/ CHRONICINFLAMMATION.- SEE MICROSCOPIC DESCRIPTION.B. STOMACH, BIOPSY:- FRAGMENTS OF GASTRIC ANTRAL AND OXYNTIC TYPE MUCOSA WITH MILD CHRONICGASTRITIS.- NEGATIVE FOR H. PYLORI ORGANISMS.- NEGATIVE FOR INTESTINAL METAPLASIA, DYSPLASIA, OR MALIGNANCY.C. ILEUM, BIOPSY:- UNREMARKABLE FRAGMENTS OF SMALL INTESTINAL MUCOSA.D. ANASTOMOSIS, BIOPSY:- COLONIC MUCOSA WITH EXTENSIVE ACTIVE INFLAMMATION AND ASSOCIATEDULCERATION.- NEGATIVE FOR DYSPLASIA OR MALIGNANCY.E. TRANSVERSE COLON, BIOPSY:- UNREMARKABLE FRAGMENTS OF COLONIC MUCOSA NEGATIVE FOR FEATURES OFINFLAMMATORY BOWEL DISEASE.F. DESCENDING COLON POLYP, BIOPSY:- FRAGMENTS OF HYPERPLASTIC POLYP.G. RECTUM, BIOPSY:- FRAGMENTS OF COLONIC MUCOSA WITH INCREASED CHRONIC INFLAMMATION ANDREACTIVE CHANGES; NEGATIVE FOR ACTIVE INFLAMMATION.- NEGATIVE FOR DYSPLASIA OR MALIGNANCY. . 8/2021: some ulcerations seen at the anastomosis. Biopsies showed mild-architecteral changes but mild.  Rectum shows active colitis present. . OS Path: 4/2014: cecum showing active and chronic colitis.  Duodenum showing marked focally active inflammation with ulcer . 5/2019: - The digital rectal exam was normal. - The retroflexed view of the distal rectum and anal verge was normal and showed no anal or rectal abnormalities. - An area of mildly congested, erythematous and eroded mucosa was found in the distal rectum.  Biopsies were taken with a cold forceps for histology. - A benign-appearing, intrinsic moderate stenosis measuring 4 mm (inner diameter) was found at the ileocecal valve and was non-traversed. - A 12 mm polypoid lesion was found at the ileocecal valve.  The lesion was sessile.  Also a smaller 9 mm polypoid lesion. No bleeding was present.  Biopsies were taken with a cold forceps for histology. - The exam was otherwise normal throughout the examined colon. . FinalPathologicDiagnosis A.ILEOCECALVALVE,BIOPSY: -ENTERICMUCOSAHASMILDCHRONICACTIVEINFLAMMATIONWITH FOCALEROSIVECHANGE. -NOGRANULOMASAREIDENTIFIED.     B.RECTUM,BIOPSY: -CHRONICACTIVECOLITIS. -NOGRANULOMASAREIDENTIFIED.     C.DUODENUM,BIOPSY: -ACTIVEDUODENITISWITHREGENERATIVECHANGE?EROSION. -NOGRANULOMASAREIDENTIFIED.     COMMENT:Thoughthehistologicfindingsarenotspecific,theyarecompatiblewiththeclinical/endoscopic considerationofinflammatoryboweldisease.Nodysplasiaisidentified.

## 2024-08-21 ENCOUNTER — TELEPHONE ENCOUNTER (OUTPATIENT)
Dept: URBAN - METROPOLITAN AREA CLINIC 96 | Facility: CLINIC | Age: 49
End: 2024-08-21

## 2024-09-11 ENCOUNTER — WEB ENCOUNTER (OUTPATIENT)
Dept: URBAN - METROPOLITAN AREA CLINIC 98 | Facility: CLINIC | Age: 49
End: 2024-09-11

## 2024-11-15 ENCOUNTER — TELEPHONE ENCOUNTER (OUTPATIENT)
Dept: URBAN - METROPOLITAN AREA CLINIC 96 | Facility: CLINIC | Age: 49
End: 2024-11-15

## 2024-11-15 RX ORDER — ADALIMUMAB-ATTO 40 MG/.8ML
1 INJECTION INJECTION SUBCUTANEOUS EVERY 2 WEEKS
Qty: 2 | Refills: 11 | OUTPATIENT
Start: 2024-11-18 | End: 2025-10-20

## 2024-11-18 ENCOUNTER — P2P PATIENT RECORD (OUTPATIENT)
Age: 49
End: 2024-11-18

## 2024-12-16 ENCOUNTER — P2P PATIENT RECORD (OUTPATIENT)
Age: 49
End: 2024-12-16

## 2024-12-16 ENCOUNTER — WEB ENCOUNTER (OUTPATIENT)
Dept: URBAN - METROPOLITAN AREA CLINIC 98 | Facility: CLINIC | Age: 49
End: 2024-12-16

## 2024-12-16 RX ORDER — ADALIMUMAB-ATTO 40 MG/.8ML
1 INJECTION INJECTION SUBCUTANEOUS EVERY 2 WEEKS
Qty: 2 | Refills: 11 | OUTPATIENT
Start: 2024-12-16 | End: 2025-11-17

## 2025-01-21 ENCOUNTER — WEB ENCOUNTER (OUTPATIENT)
Dept: URBAN - METROPOLITAN AREA CLINIC 96 | Facility: CLINIC | Age: 50
End: 2025-01-21

## 2025-01-21 RX ORDER — ADALIMUMAB-ATTO 40 MG/.8ML
1 INJECTION INJECTION SUBCUTANEOUS EVERY 2 WEEKS
Qty: 2 | Refills: 11
Start: 2024-11-18 | End: 2025-12-24

## 2025-01-22 ENCOUNTER — P2P PATIENT RECORD (OUTPATIENT)
Age: 50
End: 2025-01-22

## 2025-01-27 ENCOUNTER — P2P PATIENT RECORD (OUTPATIENT)
Age: 50
End: 2025-01-27

## 2025-01-27 ENCOUNTER — OFFICE VISIT (OUTPATIENT)
Dept: URBAN - METROPOLITAN AREA TELEHEALTH 2 | Facility: TELEHEALTH | Age: 50
End: 2025-01-27
Payer: COMMERCIAL

## 2025-01-27 VITALS — HEIGHT: 65 IN | BODY MASS INDEX: 48.82 KG/M2 | WEIGHT: 293 LBS

## 2025-01-27 DIAGNOSIS — K21.9 GERD: ICD-10-CM

## 2025-01-27 DIAGNOSIS — R19.8 BOWEL MOVEMENT SYMPTOM: ICD-10-CM

## 2025-01-27 DIAGNOSIS — Z09 FOLLOW UP: ICD-10-CM

## 2025-01-27 DIAGNOSIS — Z91.89 COLON CANCER HIGH RISK: ICD-10-CM

## 2025-01-27 DIAGNOSIS — Z79.899 LONG-TERM USE OF IMMUNOSUPPRESSANT MEDICATION: ICD-10-CM

## 2025-01-27 DIAGNOSIS — K50.80 CROHN'S DISEASE OF BOTH SMALL AND LARGE INTESTINE WITHOUT COMPLICATION: ICD-10-CM

## 2025-01-27 PROCEDURE — 99215 OFFICE O/P EST HI 40 MIN: CPT | Performed by: INTERNAL MEDICINE

## 2025-01-27 RX ORDER — LOSARTAN POTASSIUM 50 MG/1
TABLET ORAL
Qty: 0 | Refills: 0 | Status: ACTIVE | COMMUNITY
Start: 1900-01-01

## 2025-01-27 RX ORDER — MOMETASONE FUROATE 1 MG/G
OINTMENT TOPICAL
Qty: 45 GRAM | Status: ACTIVE | COMMUNITY

## 2025-01-27 RX ORDER — ONDANSETRON HYDROCHLORIDE 4 MG/1
1 TABLET TABLET, FILM COATED ORAL EVERY 4 HOURS PRN
Qty: 3 | Refills: 0 | Status: ACTIVE | COMMUNITY
Start: 2023-11-27

## 2025-01-27 RX ORDER — ADALIMUMAB-ATTO 40 MG/.4ML
0.4 ML INJECTION SUBCUTANEOUS
Status: ACTIVE | COMMUNITY

## 2025-01-27 RX ORDER — BUSPIRONE HYDROCHLORIDE 10 MG/1
TAKE 1 TABLET BY MOUTH TWICE A DAY TABLET ORAL
Qty: 60 EACH | Refills: 1 | Status: ACTIVE | COMMUNITY

## 2025-01-27 RX ORDER — ADALIMUMAB-ATTO 40 MG/.8ML
1 INJECTION INJECTION SUBCUTANEOUS EVERY 2 WEEKS
Qty: 2 | Refills: 11 | Status: ON HOLD | COMMUNITY
Start: 2024-11-18 | End: 2025-12-24

## 2025-01-27 RX ORDER — ADALIMUMAB-ATTO 40 MG/.8ML
1 INJECTION INJECTION SUBCUTANEOUS EVERY 2 WEEKS
Qty: 2 | Refills: 11 | Status: ON HOLD | COMMUNITY
Start: 2024-12-16 | End: 2025-11-17

## 2025-01-27 RX ORDER — ESOMEPRAZOLE MAGNESIUM 20 MG/1
1 CAPSULE CAPSULE, DELAYED RELEASE ORAL ONCE A DAY
Qty: 90 CAPSULE | Refills: 4 | OUTPATIENT

## 2025-01-27 RX ORDER — MONTELUKAST SODIUM 10 MG/1
1 (ONE) TABLET DAILY FOR NASAL CONGESTION TABLET, FILM COATED ORAL
Qty: 30 EACH | Refills: 1 | Status: ACTIVE | COMMUNITY

## 2025-01-27 RX ORDER — ADALIMUMAB 40MG/0.4ML
0.4 ML KIT SUBCUTANEOUS
Qty: 1 | Refills: 11 | Status: ON HOLD | COMMUNITY

## 2025-01-27 RX ORDER — ADALIMUMAB 40MG/0.4ML
0.4 ML KIT SUBCUTANEOUS
Qty: 1 | Refills: 11 | OUTPATIENT

## 2025-01-27 RX ORDER — ESOMEPRAZOLE MAGNESIUM 20 MG/1
1 CAPSULE CAPSULE, DELAYED RELEASE ORAL ONCE A DAY
Qty: 90 CAPSULE | Refills: 4 | Status: ACTIVE | COMMUNITY
Start: 2024-08-06

## 2025-01-27 NOTE — HPI-TODAY'S VISIT:
Pt Jose Guadalupe is a 50 y/o indiv with ileal and colonic CD, currently on Amjetiva/Humira (every 2 weeks), here for eval of refractory sxs. . Pt referred by Dr. Servin. . Pt was diagnosed with CD around 2014 (not on any meds), though had sxs prior to this.  She start on Humira in 2016 due to HS, by her dermatologist.  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 She was then re-referred to GI and at the time, her CD was also getting worse.  She was hospitalized 4 times in 2019 due to obstruction sxs.  The dose of Humira was then increase to weekly by Dr Servin due to progression of sxs.  In May 2020 she had partial resection due to progression of her sxs of obstruction (Dr. Jones).  Was not able to tolerate sulfasalazine due to size. . Previously on 7/27/2020, pt reports that she is having no abdominal pain while on a GI Soft diet.  She is not having any abdominal pain.  She is having regular BM.  About 24 hours after her Humira injection, she has a large BM, but otherwise, normal. Small amount of blood if she strains at time of defecation.  Her HS is stable on Humira, but not as inflammed as prior.  Pyoderma is on her leg, but is stable.  . Previously on 4/20/2021, pt reports that her GI is doing well, no diarrhea, no abdominal pain, no nausea.  HS is doing well.  . Previously on 9/13/2021, pt reports that she is otherwise doing fine. . Previously on 6/14/2023, pt reports she had retinal detachment and also thyroid resection.  Some recurrent sinus issues ever since getting covid booster.  Has sleep apnea.  Weight has been going upwards.  GI is otherwise doing well.  Having tonsils removed for isssues for URI and sleep apnea. Prev on 4/30/2024, pt reports that she has not had her Humira for 2 months due to insurance lacking of script.  Having diarrhea, rectal bleeding and some constipation (relates to flare). Prev on 8/6/2024, pt had side effects of Hyrizmoz (generic humira), severe abd pain/bowel changes.  After getting back on Humira, sxs were back to control and normal BM and no blood.  . Today on 1/27/2025, pt reports that she started to exercise, which is aggrevating her perianal irritation and bleeding; some constipation.   Urgency of BM still there. . Labs 4/2020: Hgb 13, wbc 6.8, Iron st 13, Humira 5.6 (AB neg), quant-gold neg, HepB neg, Vit D 24.1, ferritin 38, CRP 19 . 12/2023: inal Pathologic DiagnosisA. DUODENUM, BIOPSY:- DUODENAL TYPE MUCOSA WITH INCREASED INTRAEPITHELIAL LYMPHOCYTES/ CHRONICINFLAMMATION.- SEE MICROSCOPIC DESCRIPTION.B. STOMACH, BIOPSY:- FRAGMENTS OF GASTRIC ANTRAL AND OXYNTIC TYPE MUCOSA WITH MILD CHRONICGASTRITIS.- NEGATIVE FOR H. PYLORI ORGANISMS.- NEGATIVE FOR INTESTINAL METAPLASIA, DYSPLASIA, OR MALIGNANCY.C. ILEUM, BIOPSY:- UNREMARKABLE FRAGMENTS OF SMALL INTESTINAL MUCOSA.D. ANASTOMOSIS, BIOPSY:- COLONIC MUCOSA WITH EXTENSIVE ACTIVE INFLAMMATION AND ASSOCIATEDULCERATION.- NEGATIVE FOR DYSPLASIA OR MALIGNANCY.E. TRANSVERSE COLON, BIOPSY:- UNREMARKABLE FRAGMENTS OF COLONIC MUCOSA NEGATIVE FOR FEATURES OFINFLAMMATORY BOWEL DISEASE.F. DESCENDING COLON POLYP, BIOPSY:- FRAGMENTS OF HYPERPLASTIC POLYP.G. RECTUM, BIOPSY:- FRAGMENTS OF COLONIC MUCOSA WITH INCREASED CHRONIC INFLAMMATION ANDREACTIVE CHANGES; NEGATIVE FOR ACTIVE INFLAMMATION.- NEGATIVE FOR DYSPLASIA OR MALIGNANCY. . 8/2021: some ulcerations seen at the anastomosis. Biopsies showed mild-architecteral changes but mild.  Rectum shows active colitis present. . OS Path: 4/2014: cecum showing active and chronic colitis.  Duodenum showing marked focally active inflammation with ulcer . 5/2019: - The digital rectal exam was normal. - The retroflexed view of the distal rectum and anal verge was normal and showed no anal or rectal abnormalities. - An area of mildly congested, erythematous and eroded mucosa was found in the distal rectum.  Biopsies were taken with a cold forceps for histology. - A benign-appearing, intrinsic moderate stenosis measuring 4 mm (inner diameter) was found at the ileocecal valve and was non-traversed. - A 12 mm polypoid lesion was found at the ileocecal valve.  The lesion was sessile.  Also a smaller 9 mm polypoid lesion. No bleeding was present.  Biopsies were taken with a cold forceps for histology. - The exam was otherwise normal throughout the examined colon. . FinalPathologicDiagnosis A.ILEOCECALVALVE,BIOPSY: -ENTERICMUCOSAHASMILDCHRONICACTIVEINFLAMMATIONWITH FOCALEROSIVECHANGE. -NOGRANULOMASAREIDENTIFIED.     B.RECTUM,BIOPSY: -CHRONICACTIVECOLITIS. -NOGRANULOMASAREIDENTIFIED.     C.DUODENUM,BIOPSY: -ACTIVEDUODENITISWITHREGENERATIVECHANGE?EROSION. -NOGRANULOMASAREIDENTIFIED.     COMMENT:Thoughthehistologicfindingsarenotspecific,theyarecompatiblewiththeclinical/endoscopic considerationofinflammatoryboweldisease.Nodysplasiaisidentified.

## 2025-03-20 ENCOUNTER — LAB OUTSIDE AN ENCOUNTER (OUTPATIENT)
Dept: URBAN - METROPOLITAN AREA CLINIC 96 | Facility: CLINIC | Age: 50
End: 2025-03-20

## 2025-03-31 LAB
ADALIMUMAB DRUG LEVEL: 2.9
ALBUMIN: 4
ALKALINE PHOSPHATASE: 109
ALT (SGPT): 13
ANTI-ADALIMUMAB ANTIBODY: 26
AST (SGOT): 19
BASO (ABSOLUTE): 0
BASOS: 1
BILIRUBIN, TOTAL: 0.6
BUN/CREATININE RATIO: 16
BUN: 12
CALCIUM: 9.4
CARBON DIOXIDE, TOTAL: 21
CHLORIDE: 102
CREATININE: 0.74
EGFR: 99
EOS (ABSOLUTE): 0.1
EOS: 2
FERRITIN, SERUM: 32
GLOBULIN, TOTAL: 3.6
GLUCOSE: 88
HEMATOCRIT: 38.7
HEMATOLOGY COMMENTS:: (no result)
HEMOGLOBIN: 12.5
IMMATURE CELLS: (no result)
IMMATURE GRANS (ABS): 0
IMMATURE GRANULOCYTES: 0
LYMPHS (ABSOLUTE): 2.5
LYMPHS: 38
Lab: (no result)
MCH: 26.9
MCHC: 32.3
MCV: 83
MONOCYTES(ABSOLUTE): 0.6
MONOCYTES: 9
NEUTROPHILS (ABSOLUTE): 3.3
NEUTROPHILS: 50
NRBC: (no result)
PLATELETS: 333
POTASSIUM: 4.1
PROTEIN, TOTAL: 7.6
QUANTIFERON CRITERIA: (no result)
QUANTIFERON INCUBATION: (no result)
QUANTIFERON MITOGEN VALUE: >10
QUANTIFERON NIL VALUE: 0.06
QUANTIFERON TB1 AG VALUE: 0.07
QUANTIFERON TB2 AG VALUE: 0.09
QUANTIFERON-TB GOLD PLUS: NEGATIVE
RBC: 4.65
RDW: 14.5
SODIUM: 138
VITAMIN B12: 968
WBC: 6.5